# Patient Record
Sex: FEMALE | Race: WHITE | NOT HISPANIC OR LATINO | Employment: OTHER | URBAN - METROPOLITAN AREA
[De-identification: names, ages, dates, MRNs, and addresses within clinical notes are randomized per-mention and may not be internally consistent; named-entity substitution may affect disease eponyms.]

---

## 2020-06-22 ENCOUNTER — HOSPITAL ENCOUNTER (EMERGENCY)
Facility: HOSPITAL | Age: 65
Discharge: HOME | End: 2020-06-22
Attending: EMERGENCY MEDICINE | Admitting: EMERGENCY MEDICINE

## 2020-06-22 VITALS
HEIGHT: 64 IN | DIASTOLIC BLOOD PRESSURE: 73 MMHG | BODY MASS INDEX: 35.85 KG/M2 | SYSTOLIC BLOOD PRESSURE: 176 MMHG | RESPIRATION RATE: 16 BRPM | TEMPERATURE: 98 F | WEIGHT: 210 LBS | HEART RATE: 95 BPM | OXYGEN SATURATION: 95 %

## 2020-06-22 DIAGNOSIS — I10 HYPERTENSION, UNSPECIFIED TYPE: ICD-10-CM

## 2020-06-22 DIAGNOSIS — R25.1 TREMOR: Primary | ICD-10-CM

## 2020-06-22 DIAGNOSIS — U07.1 REAL TIME REVERSE TRANSCRIPTASE PCR POSITIVE FOR COVID-19 VIRUS: ICD-10-CM

## 2020-06-22 LAB
ALBUMIN SERPL-MCNC: 3.8 G/DL (ref 3.5–5.2)
ALBUMIN/GLOB SERPL: 1.3 G/DL
ALP SERPL-CCNC: 104 U/L (ref 39–117)
ALT SERPL W P-5'-P-CCNC: 85 U/L (ref 1–33)
ANION GAP SERPL CALCULATED.3IONS-SCNC: 10 MMOL/L (ref 5–15)
AST SERPL-CCNC: 35 U/L (ref 1–32)
BASOPHILS # BLD AUTO: 0.1 10*3/MM3 (ref 0–0.2)
BASOPHILS NFR BLD AUTO: 0.8 % (ref 0–1.5)
BILIRUB SERPL-MCNC: 0.3 MG/DL (ref 0.2–1.2)
BUN BLD-MCNC: 13 MG/DL (ref 8–23)
BUN/CREAT SERPL: 17.8 (ref 7–25)
CALCIUM SPEC-SCNC: 9.2 MG/DL (ref 8.6–10.5)
CHLORIDE SERPL-SCNC: 108 MMOL/L (ref 98–107)
CO2 SERPL-SCNC: 23 MMOL/L (ref 22–29)
CREAT BLD-MCNC: 0.73 MG/DL (ref 0.57–1)
DEPRECATED RDW RBC AUTO: 55.7 FL (ref 37–54)
EOSINOPHIL # BLD AUTO: 0.29 10*3/MM3 (ref 0–0.4)
EOSINOPHIL NFR BLD AUTO: 2.2 % (ref 0.3–6.2)
ERYTHROCYTE [DISTWIDTH] IN BLOOD BY AUTOMATED COUNT: 15 % (ref 12.3–15.4)
GFR SERPL CREATININE-BSD FRML MDRD: 96 ML/MIN/1.73
GLOBULIN UR ELPH-MCNC: 3 GM/DL
GLUCOSE BLD-MCNC: 113 MG/DL (ref 65–99)
HCT VFR BLD AUTO: 43.6 % (ref 34–46.6)
HGB BLD-MCNC: 14.5 G/DL (ref 12–15.9)
IMM GRANULOCYTES # BLD AUTO: 0.1 10*3/MM3 (ref 0–0.05)
IMM GRANULOCYTES NFR BLD AUTO: 0.8 % (ref 0–0.5)
LARGE PLATELETS: NORMAL
LYMPHOCYTES # BLD AUTO: 4.31 10*3/MM3 (ref 0.7–3.1)
LYMPHOCYTES NFR BLD AUTO: 33 % (ref 19.6–45.3)
MCH RBC QN AUTO: 33.1 PG (ref 26.6–33)
MCHC RBC AUTO-ENTMCNC: 33.3 G/DL (ref 31.5–35.7)
MCV RBC AUTO: 99.5 FL (ref 79–97)
MONOCYTES # BLD AUTO: 1.25 10*3/MM3 (ref 0.1–0.9)
MONOCYTES NFR BLD AUTO: 9.6 % (ref 5–12)
NEUTROPHILS # BLD AUTO: 7 10*3/MM3 (ref 1.7–7)
NEUTROPHILS NFR BLD AUTO: 53.6 % (ref 42.7–76)
NEUTS VAC BLD QL SMEAR: NORMAL
NRBC BLD AUTO-RTO: 0 /100 WBC (ref 0–0.2)
PLATELET # BLD AUTO: 341 10*3/MM3 (ref 140–450)
PMV BLD AUTO: 10.9 FL (ref 6–12)
POTASSIUM BLD-SCNC: 4 MMOL/L (ref 3.5–5.2)
PROT SERPL-MCNC: 6.8 G/DL (ref 6–8.5)
RBC # BLD AUTO: 4.38 10*6/MM3 (ref 3.77–5.28)
RBC MORPH BLD: NORMAL
SODIUM BLD-SCNC: 141 MMOL/L (ref 136–145)
TSH SERPL DL<=0.05 MIU/L-ACNC: 2.48 UIU/ML (ref 0.27–4.2)
WBC NRBC COR # BLD: 13.05 10*3/MM3 (ref 3.4–10.8)

## 2020-06-22 PROCEDURE — 80053 COMPREHEN METABOLIC PANEL: CPT | Performed by: PHYSICIAN ASSISTANT

## 2020-06-22 PROCEDURE — 99284 EMERGENCY DEPT VISIT MOD MDM: CPT

## 2020-06-22 PROCEDURE — 84443 ASSAY THYROID STIM HORMONE: CPT | Performed by: PHYSICIAN ASSISTANT

## 2020-06-22 PROCEDURE — 85025 COMPLETE CBC W/AUTO DIFF WBC: CPT | Performed by: PHYSICIAN ASSISTANT

## 2020-06-22 PROCEDURE — 85007 BL SMEAR W/DIFF WBC COUNT: CPT | Performed by: PHYSICIAN ASSISTANT

## 2020-06-22 RX ORDER — METRONIDAZOLE 500 MG/1
500 TABLET ORAL 3 TIMES DAILY
COMMUNITY
End: 2020-09-17

## 2020-06-22 RX ORDER — LISINOPRIL 10 MG/1
10 TABLET ORAL DAILY
COMMUNITY
End: 2020-09-17

## 2020-06-22 RX ORDER — CEFUROXIME AXETIL 500 MG/1
500 TABLET ORAL 2 TIMES DAILY
COMMUNITY
End: 2020-09-17

## 2020-06-22 RX ORDER — LACTOBACILLUS ACIDOPHILUS / LACTOBACILLUS BULGARICUS 100 MILLION CFU STRENGTH
GRANULES ORAL 3 TIMES DAILY
COMMUNITY
End: 2020-09-17

## 2020-06-22 RX ORDER — AMLODIPINE BESYLATE AND BENAZEPRIL HYDROCHLORIDE 10; 20 MG/1; MG/1
1 CAPSULE ORAL DAILY
COMMUNITY
End: 2020-09-17

## 2020-06-22 RX ORDER — HYDRALAZINE HYDROCHLORIDE 50 MG/1
50 TABLET, FILM COATED ORAL 3 TIMES DAILY
COMMUNITY
End: 2020-09-17

## 2020-06-22 NOTE — ED PROVIDER NOTES
EMERGENCY DEPARTMENT ENCOUNTER    Room Number:  19/19  Date of encounter:  6/23/2020  PCP: Provider, No Known  Historian: Patient      HPI:  Chief Complaint: Shaking all over    A complete HPI/ROS/PMH/PSH/SH/FH are unobtainable due to: N/A    Context: Cristina Medrano is a 65 y.o. female who presents to the ED c/o shaking all over that began last night.  States she recently got discharged from [facility] been diagnosed with COVID.  Apparently had positive blood cultures they covered her with cefuroxime and Flagyl.  She been discharged home a couple days ago.  That she began shaking all over not 1 extremity or the other.  States she has had the most in the upper extremities.  Denies fevers or chills associated with this.  She denies any shortness of breath or cough associated with this.  She said no headache no visual disturbances.      PAST MEDICAL HISTORY  Active Ambulatory Problems     Diagnosis Date Noted   • No Active Ambulatory Problems     Resolved Ambulatory Problems     Diagnosis Date Noted   • No Resolved Ambulatory Problems     Past Medical History:   Diagnosis Date   • Arthritis    • COPD (chronic obstructive pulmonary disease) (CMS/[facility])    • Hyperlipidemia    • Hypertension          PAST SURGICAL HISTORY  History reviewed. No pertinent surgical history.      FAMILY HISTORY  History reviewed. No pertinent family history.      SOCIAL HISTORY  Social History     Socioeconomic History   • Marital status:      Spouse name: Not on file   • Number of children: Not on file   • Years of education: Not on file   • Highest education level: Not on file   Tobacco Use   • Smoking status: Current Every Day Smoker     Packs/day: 1.00   Substance and Sexual Activity   • Alcohol use: Never     Frequency: Never   • Drug use: Never   • Sexual activity: Defer         ALLERGIES  Patient has no known allergies.        REVIEW OF SYSTEMS  Review of Systems     All systems reviewed and negative except for those  discussed in HPI.       PHYSICAL EXAM    I have reviewed the triage vital signs and nursing notes.    ED Triage Vitals [06/22/20 1224]   Temp Heart Rate Resp BP SpO2   98 °F (36.7 °C) 116 18 173/71 95 %      Temp src Heart Rate Source Patient Position BP Location FiO2 (%)   Infrared Monitor Sitting Left arm --       Physical Exam  GENERAL: Warm pink dry afebrile well developed.  Appears in no acute distress.  HENT: Nares patent  EYES: No scleral icterus  CV: Regular rhythm, regular rate  RESPIRATORY: Normal effort.  No audible wheezes, rales or rhonchi  ABDOMEN: Soft, nontender  MUSCULOSKELETAL: No deformities.   NEURO: Alert, moves all extremities, follows commands she has some upper extremity tremors of both upper extremities but they seem to resolve with distraction.  Lower extremities are not tremulous.  Normal finger-to-nose no palmar drift.  Strength 5/5.  SKIN: Warm, dry, no rash visualized        LAB RESULTS  Recent Results (from the past 24 hour(s))   Comprehensive Metabolic Panel    Collection Time: 06/22/20  4:43 PM   Result Value Ref Range    Glucose 113 (H) 65 - 99 mg/dL    BUN 13 8 - 23 mg/dL    Creatinine 0.73 0.57 - 1.00 mg/dL    Sodium 141 136 - 145 mmol/L    Potassium 4.0 3.5 - 5.2 mmol/L    Chloride 108 (H) 98 - 107 mmol/L    CO2 23.0 22.0 - 29.0 mmol/L    Calcium 9.2 8.6 - 10.5 mg/dL    Total Protein 6.8 6.0 - 8.5 g/dL    Albumin 3.80 3.50 - 5.20 g/dL    ALT (SGPT) 85 (H) 1 - 33 U/L    AST (SGOT) 35 (H) 1 - 32 U/L    Alkaline Phosphatase 104 39 - 117 U/L    Total Bilirubin 0.3 0.2 - 1.2 mg/dL    eGFR  African Amer 96 >60 mL/min/1.73    Globulin 3.0 gm/dL    A/G Ratio 1.3 g/dL    BUN/Creatinine Ratio 17.8 7.0 - 25.0    Anion Gap 10.0 5.0 - 15.0 mmol/L   CBC Auto Differential    Collection Time: 06/22/20  4:43 PM   Result Value Ref Range    WBC 13.05 (H) 3.40 - 10.80 10*3/mm3    RBC 4.38 3.77 - 5.28 10*6/mm3    Hemoglobin 14.5 12.0 - 15.9 g/dL    Hematocrit 43.6 34.0 - 46.6 %    MCV 99.5 (H) 79.0 -  97.0 fL    MCH 33.1 (H) 26.6 - 33.0 pg    MCHC 33.3 31.5 - 35.7 g/dL    RDW 15.0 12.3 - 15.4 %    RDW-SD 55.7 (H) 37.0 - 54.0 fl    MPV 10.9 6.0 - 12.0 fL    Platelets 341 140 - 450 10*3/mm3    Neutrophil % 53.6 42.7 - 76.0 %    Lymphocyte % 33.0 19.6 - 45.3 %    Monocyte % 9.6 5.0 - 12.0 %    Eosinophil % 2.2 0.3 - 6.2 %    Basophil % 0.8 0.0 - 1.5 %    Immature Grans % 0.8 (H) 0.0 - 0.5 %    Neutrophils, Absolute 7.00 1.70 - 7.00 10*3/mm3    Lymphocytes, Absolute 4.31 (H) 0.70 - 3.10 10*3/mm3    Monocytes, Absolute 1.25 (H) 0.10 - 0.90 10*3/mm3    Eosinophils, Absolute 0.29 0.00 - 0.40 10*3/mm3    Basophils, Absolute 0.10 0.00 - 0.20 10*3/mm3    Immature Grans, Absolute 0.10 (H) 0.00 - 0.05 10*3/mm3    nRBC 0.0 0.0 - 0.2 /100 WBC   TSH    Collection Time: 06/22/20  4:43 PM   Result Value Ref Range    TSH 2.480 0.270 - 4.200 uIU/mL   Scan Slide    Collection Time: 06/22/20  4:43 PM   Result Value Ref Range    RBC Morphology Normal Normal    Vacuolated Neutrophils Slight/1+ None Seen    Large Platelets Slight/1+ None Seen       Ordered the above labs and independently reviewed the results.        RADIOLOGY  No Radiology Exams Resulted Within Past 24 Hours    I ordered and reviewed the above noted radiographic studies.          PROCEDURES    Procedures      MEDICATIONS GIVEN IN ER    Medications - No data to display      PROGRESS, DATA ANALYSIS, CONSULTS, AND MEDICAL DECISION MAKING    All labs have been independently reviewed by me.  All radiology studies have been reviewed by me and the radiologist dictating the report.   EKG's have been independently viewed and interpreted by me.      Differential diagnoses:            Discussed the findings with the patient.  We will have her follow-up with her primary care physician.  Her O2 sats look appropriate and see think it looks acute.      AS OF 07:19 VITALS:    BP - 176/73  HR - 95  TEMP - 98 °F (36.7 °C) (Infrared)  O2 SATS - 95%        DIAGNOSIS  Final diagnoses:    Tremor   Real time reverse transcriptase PCR positive for COVID-19 virus   Hypertension, unspecified type         DISPOSITION  DISCHARGE    Patient discharged in stable condition.    Reviewed implications of results, diagnosis, meds, responsibility to follow up, warning signs and symptoms of possible worsening, potential complications and reasons to return to ER.    Patient/Family voiced understanding of above instructions.    Discussed plan for discharge, as there is no emergent indication for admission.  Pt/family is agreeable and understands need for follow up and possible repeat testing.  Pt/family is aware that discharge does not mean that nothing is wrong but that it indicates no emergency is currently present that requires admission and they must continue care with follow-up as given below or with a physician of their choice.     FOLLOW-UP  Your primary care provider as set up at [facility].               Medication List      New Prescriptions    metoprolol tartrate 25 MG tablet  Commonly known as:  LOPRESSOR  Take 1 tablet by mouth 2 (Two) Times a Day.                     Luis Raymundo PA  06/23/20 0718

## 2020-09-17 ENCOUNTER — HOSPITAL ENCOUNTER (EMERGENCY)
Facility: HOSPITAL | Age: 65
Discharge: HOME | End: 2020-09-17
Attending: EMERGENCY MEDICINE | Admitting: EMERGENCY MEDICINE

## 2020-09-17 ENCOUNTER — APPOINTMENT (OUTPATIENT)
Dept: MRI IMAGING | Facility: HOSPITAL | Age: 65
End: 2020-09-17

## 2020-09-17 VITALS
BODY MASS INDEX: 29.88 KG/M2 | HEIGHT: 64 IN | HEART RATE: 75 BPM | DIASTOLIC BLOOD PRESSURE: 74 MMHG | SYSTOLIC BLOOD PRESSURE: 172 MMHG | OXYGEN SATURATION: 96 % | WEIGHT: 175 LBS | TEMPERATURE: 98.7 F | RESPIRATION RATE: 18 BRPM

## 2020-09-17 DIAGNOSIS — J32.0 LEFT MAXILLARY SINUSITIS: Primary | ICD-10-CM

## 2020-09-17 DIAGNOSIS — R13.10 DYSPHAGIA, UNSPECIFIED TYPE: ICD-10-CM

## 2020-09-17 DIAGNOSIS — I10 ELEVATED BLOOD PRESSURE READING WITH DIAGNOSIS OF HYPERTENSION: ICD-10-CM

## 2020-09-17 LAB
ALBUMIN SERPL-MCNC: 3.9 G/DL (ref 3.5–5.2)
ALBUMIN/GLOB SERPL: 1.3 G/DL
ALP SERPL-CCNC: 100 U/L (ref 39–117)
ALT SERPL W P-5'-P-CCNC: 29 U/L (ref 1–33)
ANION GAP SERPL CALCULATED.3IONS-SCNC: 10 MMOL/L (ref 5–15)
AST SERPL-CCNC: 22 U/L (ref 1–32)
BASOPHILS # BLD AUTO: 0.07 10*3/MM3 (ref 0–0.2)
BASOPHILS NFR BLD AUTO: 0.6 % (ref 0–1.5)
BILIRUB SERPL-MCNC: 0.4 MG/DL (ref 0–1.2)
BILIRUB UR QL STRIP: NEGATIVE
BUN SERPL-MCNC: 4 MG/DL (ref 8–23)
BUN/CREAT SERPL: 6.5 (ref 7–25)
CALCIUM SPEC-SCNC: 9.6 MG/DL (ref 8.6–10.5)
CHLORIDE SERPL-SCNC: 104 MMOL/L (ref 98–107)
CLARITY UR: CLEAR
CO2 SERPL-SCNC: 28 MMOL/L (ref 22–29)
COLOR UR: YELLOW
CREAT SERPL-MCNC: 0.62 MG/DL (ref 0.57–1)
DEPRECATED RDW RBC AUTO: 57.9 FL (ref 37–54)
EOSINOPHIL # BLD AUTO: 0.22 10*3/MM3 (ref 0–0.4)
EOSINOPHIL NFR BLD AUTO: 1.8 % (ref 0.3–6.2)
ERYTHROCYTE [DISTWIDTH] IN BLOOD BY AUTOMATED COUNT: 15 % (ref 12.3–15.4)
GFR SERPL CREATININE-BSD FRML MDRD: 116 ML/MIN/1.73
GLOBULIN UR ELPH-MCNC: 3 GM/DL
GLUCOSE SERPL-MCNC: 102 MG/DL (ref 65–99)
GLUCOSE UR STRIP-MCNC: NEGATIVE MG/DL
HCT VFR BLD AUTO: 47.2 % (ref 34–46.6)
HGB BLD-MCNC: 15.5 G/DL (ref 12–15.9)
HGB UR QL STRIP.AUTO: NEGATIVE
IMM GRANULOCYTES # BLD AUTO: 0.05 10*3/MM3 (ref 0–0.05)
IMM GRANULOCYTES NFR BLD AUTO: 0.4 % (ref 0–0.5)
KETONES UR QL STRIP: NEGATIVE
LEUKOCYTE ESTERASE UR QL STRIP.AUTO: NEGATIVE
LYMPHOCYTES # BLD AUTO: 3.62 10*3/MM3 (ref 0.7–3.1)
LYMPHOCYTES NFR BLD AUTO: 30.2 % (ref 19.6–45.3)
MCH RBC QN AUTO: 33.9 PG (ref 26.6–33)
MCHC RBC AUTO-ENTMCNC: 32.8 G/DL (ref 31.5–35.7)
MCV RBC AUTO: 103.3 FL (ref 79–97)
MONOCYTES # BLD AUTO: 0.9 10*3/MM3 (ref 0.1–0.9)
MONOCYTES NFR BLD AUTO: 7.5 % (ref 5–12)
NEUTROPHILS NFR BLD AUTO: 59.5 % (ref 42.7–76)
NEUTROPHILS NFR BLD AUTO: 7.13 10*3/MM3 (ref 1.7–7)
NITRITE UR QL STRIP: NEGATIVE
NRBC BLD AUTO-RTO: 0 /100 WBC (ref 0–0.2)
PH UR STRIP.AUTO: 7.5 [PH] (ref 5–8)
PLATELET # BLD AUTO: 278 10*3/MM3 (ref 140–450)
PMV BLD AUTO: 11.7 FL (ref 6–12)
POTASSIUM SERPL-SCNC: 3.6 MMOL/L (ref 3.5–5.2)
PROT SERPL-MCNC: 6.9 G/DL (ref 6–8.5)
PROT UR QL STRIP: NEGATIVE
RBC # BLD AUTO: 4.57 10*6/MM3 (ref 3.77–5.28)
SODIUM SERPL-SCNC: 142 MMOL/L (ref 136–145)
SP GR UR STRIP: <=1.005 (ref 1–1.03)
TROPONIN T SERPL-MCNC: <0.01 NG/ML (ref 0–0.03)
UROBILINOGEN UR QL STRIP: NORMAL
WBC # BLD AUTO: 11.99 10*3/MM3 (ref 3.4–10.8)

## 2020-09-17 PROCEDURE — 96374 THER/PROPH/DIAG INJ IV PUSH: CPT

## 2020-09-17 PROCEDURE — 70551 MRI BRAIN STEM W/O DYE: CPT

## 2020-09-17 PROCEDURE — 85025 COMPLETE CBC W/AUTO DIFF WBC: CPT | Performed by: EMERGENCY MEDICINE

## 2020-09-17 PROCEDURE — 81003 URINALYSIS AUTO W/O SCOPE: CPT | Performed by: EMERGENCY MEDICINE

## 2020-09-17 PROCEDURE — 93005 ELECTROCARDIOGRAM TRACING: CPT | Performed by: EMERGENCY MEDICINE

## 2020-09-17 PROCEDURE — 80053 COMPREHEN METABOLIC PANEL: CPT | Performed by: EMERGENCY MEDICINE

## 2020-09-17 PROCEDURE — 84484 ASSAY OF TROPONIN QUANT: CPT | Performed by: EMERGENCY MEDICINE

## 2020-09-17 PROCEDURE — 99284 EMERGENCY DEPT VISIT MOD MDM: CPT

## 2020-09-17 RX ORDER — LABETALOL HYDROCHLORIDE 5 MG/ML
20 INJECTION, SOLUTION INTRAVENOUS ONCE
Status: COMPLETED | OUTPATIENT
Start: 2020-09-17 | End: 2020-09-17

## 2020-09-17 RX ORDER — LOSARTAN POTASSIUM 50 MG/1
50 TABLET ORAL DAILY
Qty: 30 TABLET | Refills: 0 | Status: SHIPPED | OUTPATIENT
Start: 2020-09-17 | End: 2020-10-19

## 2020-09-17 RX ORDER — LOSARTAN POTASSIUM 25 MG/1
25 TABLET ORAL DAILY
COMMUNITY
End: 2020-09-17 | Stop reason: SDUPTHER

## 2020-09-17 RX ORDER — LOSARTAN POTASSIUM 50 MG/1
50 TABLET ORAL
Status: DISCONTINUED | OUTPATIENT
Start: 2020-09-17 | End: 2020-09-17 | Stop reason: HOSPADM

## 2020-09-17 RX ORDER — AMOXICILLIN 400 MG/5ML
800 POWDER, FOR SUSPENSION ORAL 2 TIMES DAILY
Qty: 200 ML | Refills: 0 | Status: SHIPPED | OUTPATIENT
Start: 2020-09-17 | End: 2020-10-19

## 2020-09-17 RX ORDER — METOPROLOL TARTRATE 50 MG/1
50 TABLET, FILM COATED ORAL 2 TIMES DAILY
Qty: 30 TABLET | Refills: 0 | Status: SHIPPED | OUTPATIENT
Start: 2020-09-17 | End: 2020-10-19

## 2020-09-17 RX ADMIN — LOSARTAN POTASSIUM 50 MG: 50 TABLET, FILM COATED ORAL at 15:53

## 2020-09-17 RX ADMIN — LABETALOL 20 MG/4 ML (5 MG/ML) INTRAVENOUS SYRINGE 20 MG: at 18:07

## 2020-09-17 NOTE — ED PROVIDER NOTES
Subjective   Mrs. Medrano is sent from Dr. Colindres for concerns about her blood pressure.  They reported EMS that when they saw her today her blood pressure was over 200 systolic.  Mrs. Medrano tells me she has had a headache for a couple of days.  Her main concern is that of dry mouth.  This has been an ongoing issue with her.  She tells me she has been to the emergency department at [facility] at least 3 times for that.  Her primary care provider reported to EMS today that she has had aphasia for a month or 2.  Mrs. Medrano tells me that she does sometimes have trouble getting her words out.  She has numerous drug intolerances and tells me her doctors have been having a hard time getting her blood pressure under control.  She denies fevers or chills.  She denies focal numbness or weakness in any extremity but tells me she does feel off balance and dizzy when she walks.      History provided by:  Patient and EMS personnel  Hypertension  Severity:  Severe  Onset quality:  Gradual  Timing:  Intermittent  Progression:  Worsening  Chronicity:  Recurrent  Relieved by:  Nothing  Worsened by:  Nothing  Associated symptoms: dizziness, fatigue and headaches    Associated symptoms: no abdominal pain, no blurred vision, no chest pain, no confusion, no nausea, no neck pain, no shortness of breath and not vomiting    Headache  Associated symptoms: congestion, dizziness and fatigue    Associated symptoms: no abdominal pain, no back pain, no blurred vision, no cough, no nausea, no neck pain and no vomiting        Review of Systems   Constitutional: Positive for fatigue. Negative for chills.   HENT: Positive for congestion and rhinorrhea.    Eyes: Negative for blurred vision and visual disturbance.   Respiratory: Negative for cough and shortness of breath.    Cardiovascular: Negative for chest pain and leg swelling.   Gastrointestinal: Negative for abdominal pain, nausea and vomiting.   Genitourinary: Negative for difficulty  urinating and dysuria.   Musculoskeletal: Negative for back pain and neck pain.   Neurological: Positive for dizziness and headaches.   Psychiatric/Behavioral: Negative for confusion.       Past Medical History:   Diagnosis Date   • Arthritis    • COPD (chronic obstructive pulmonary disease) (CMS/[facility])    • Hyperlipidemia    • Hypertension        Allergies   Allergen Reactions   • Clonidine Derivatives Angioedema   • Hydralazine Unknown - Low Severity   • Lisinopril Cough   • Prednisone Other (See Comments)     SHAKING AND BURNING       History reviewed. No pertinent surgical history.    History reviewed. No pertinent family history.    Social History     Socioeconomic History   • Marital status:      Spouse name: Not on file   • Number of children: Not on file   • Years of education: Not on file   • Highest education level: Not on file   Tobacco Use   • Smoking status: Current Every Day Smoker     Packs/day: 1.00   Substance and Sexual Activity   • Alcohol use: Never     Frequency: Never   • Drug use: Never   • Sexual activity: Defer           Objective   Physical Exam  Vitals signs and nursing note reviewed.   HENT:      Head: Normocephalic and atraumatic.      Mouth/Throat:      Mouth: Mucous membranes are moist.      Pharynx: Oropharynx is clear. No oropharyngeal exudate or posterior oropharyngeal erythema.      Comments: She is handling her secretions well  Eyes:      General: No scleral icterus.     Conjunctiva/sclera: Conjunctivae normal.   Neck:      Musculoskeletal: Normal range of motion and neck supple.      Comments: No JVD  Cardiovascular:      Rate and Rhythm: Normal rate and regular rhythm.      Heart sounds: No murmur. No friction rub.   Pulmonary:      Effort: Pulmonary effort is normal.      Breath sounds: Normal breath sounds. No wheezing, rhonchi or rales.   Abdominal:      General: Abdomen is flat. Bowel sounds are normal. There is no distension.      Palpations: Abdomen is soft.       Tenderness: There is no abdominal tenderness.   Musculoskeletal:         General: No swelling or tenderness.      Right lower leg: No edema.      Left lower leg: No edema.   Skin:     General: Skin is warm and dry.   Neurological:      General: No focal deficit present.      Mental Status: She is alert and oriented to person, place, and time.      Comments: She is fully oriented.   are equal.  Finger-to-nose testing is intact and symmetric   Psychiatric:         Mood and Affect: Mood normal.         Behavior: Behavior normal.         Thought Content: Thought content normal.         Procedures           ED Course  ED Course as of Sep 17 1936   Thu Sep 17, 2020   1832 Mrs. Medrano's sister pulled me aside while she was at MRI.  She advises me that since Mrs. Medrano was admitted to the hospital 4 months ago she has had a change in personality.  She reports that she has been anxious, her  and family are afraid to leave her alone.  She has been to numerous emergency departments and is having issues with her blood pressure as well as anxiety.  Upon repeat exam her blood pressure is 149/62 and she is calm.  She remains alert and in no distress.  She was fully oriented on on my exam.  I spoke with him about negative MRI findings and possibility of sinusitis.  She tells me she tolerates liquid amoxicillin which I will provide.  Her sister tells me she has an appointment with Dr. SHIVAM Tucker soon to evaluate her difficulty swallowing.  I will also refer her to follow-up with neurology and gastroenterology.  I brought up the possibility of ridge evaluation and she declines.    [DT]   1837 I spoke with her about her blood pressure medicines and see shows me her bottles and the only thing she is taking now is 25 mg of Cozaar daily and 50 mg metoprolol twice daily.  I will increase her Cozaar to 50 a day.    [DT]      ED Course User Index  [DT] Arnie Olsen MD                                           [facility]  Number of  Diagnoses or Management Options  Dysphagia, unspecified type: established and worsening  Elevated blood pressure reading with diagnosis of hypertension: established and worsening  Left maxillary sinusitis: new and requires workup     Amount and/or Complexity of Data Reviewed  Clinical lab tests: reviewed and ordered  Tests in the radiology section of CPT®: ordered and reviewed  Decide to obtain previous medical records or to obtain history from someone other than the patient: yes  Obtain history from someone other than the patient: yes  Review and summarize past medical records: yes  Independent visualization of images, tracings, or specimens: yes    Patient Progress  Patient progress: improved      Final diagnoses:   Left maxillary sinusitis   Elevated blood pressure reading with diagnosis of hypertension   Dysphagia, unspecified type            Arnie Olsen MD  09/17/20 1936

## 2020-09-17 NOTE — DISCHARGE INSTRUCTIONS
Begin the higher dose of Cozaar in the morning.  Call the [facility] neurology office in the morning and arrange follow-up with them.  Call the [facility] gastroenterology office in the morning as well and arrange follow-up with them for further evaluation of your difficulty swallowing.Please call ASAP to arrange outpatient follow up with one of the following gastroenterologists:    [facility] GI Provider Dr. Noe Gould, Dr. SHIRAZ Roth, Dr. Bill Lee, ANDREY Hutchison    Office Location:  [address] [place] [address]    Office # [phone]    Or    [facility] GI Provider Dr. Juan Kaur and Dr. Luis Manuel Trimble    Office Location:   [address] [place] [address]    Office # [phone]

## 2020-10-19 ENCOUNTER — OFFICE VISIT (OUTPATIENT)
Dept: GASTROENTEROLOGY | Facility: CLINIC | Age: 65
End: 2020-10-19

## 2020-10-19 VITALS
HEART RATE: 89 BPM | SYSTOLIC BLOOD PRESSURE: 137 MMHG | DIASTOLIC BLOOD PRESSURE: 74 MMHG | HEIGHT: 64 IN | BODY MASS INDEX: 29.5 KG/M2 | WEIGHT: 172.8 LBS | TEMPERATURE: 97.5 F

## 2020-10-19 DIAGNOSIS — R14.0 BLOATING: Primary | ICD-10-CM

## 2020-10-19 PROCEDURE — 99204 OFFICE O/P NEW MOD 45 MIN: CPT | Performed by: NURSE PRACTITIONER

## 2020-10-19 RX ORDER — ATENOLOL 50 MG/1
50 TABLET ORAL DAILY
COMMUNITY
Start: 2020-10-11

## 2020-10-19 RX ORDER — CITALOPRAM 20 MG/1
20 TABLET ORAL DAILY
COMMUNITY
Start: 2020-09-02 | End: 2021-03-17

## 2020-10-19 RX ORDER — SERTRALINE HYDROCHLORIDE 25 MG/1
1 TABLET, FILM COATED ORAL DAILY
COMMUNITY
Start: 2020-09-18 | End: 2021-03-17

## 2020-10-19 RX ORDER — CLONIDINE HYDROCHLORIDE 0.1 MG/1
1 TABLET ORAL DAILY
COMMUNITY
Start: 2020-07-28 | End: 2021-03-17

## 2020-10-19 NOTE — PROGRESS NOTES
GASTROENTEROLOGY OFFICE NOTE  Cristina Medrano  [id number]  1955    CARE TEAM  Patient Care Team:  Umu Colindres MD as PCP - General (Family Medicine)    Referring Provider: mUu Colindres MD    Chief Complaint   Patient presents with   • Bloated        HISTORY OF PRESENT ILLNESS:  Ms. Medrano presents with a 4-month history of abdominal bloating and belching.  She reports that when her symptoms first began she felt bloated after eating frequently but was not expelling gas.  Over the past few weeks she has been having a significant amount of belching.  She has noted some foods that make her bloating worse.  She denies acid reflux, she reports she has had heartburn maybe twice in her life.  She denies associated nausea, vomiting, dysphagia and odynophagia.  She has a soft formed bowel movement daily, sometimes 2 daily.  She reports a history of colon polyps, she recalls 3 colon polyps and her last colonoscopy that was done many years ago.    PAST MEDICAL HISTORY  Past Medical History:   Diagnosis Date   • Arthritis    • COPD (chronic obstructive pulmonary disease) (CMS/[facility])    • Hyperlipidemia    • Hypertension         PAST SURGICAL HISTORY  History reviewed. No pertinent surgical history.     MEDICATIONS:    Current Outpatient Medications:   •  sertraline (Zoloft) 25 MG tablet, Take 1 tablet by mouth Daily., Disp: , Rfl:   •  atenolol (TENORMIN) 50 MG tablet, Take 50 mg by mouth Daily., Disp: , Rfl:   •  citalopram (CeleXA) 20 MG tablet, Take 20 mg by mouth Daily. FOR 30 DAYS, Disp: , Rfl:   •  cloNIDine (CATAPRES) 0.1 MG tablet, Take 1 tablet by mouth Daily., Disp: , Rfl:     ALLERGIES  Allergies   Allergen Reactions   • Amlodipine Other (See Comments)   • Antihistamines, Chlorpheniramine-Type Other (See Comments)   • Clonidine Derivatives Angioedema   • Hydralazine Unknown - Low Severity   • Lisinopril Cough   • Metoprolol Other (See Comments)   • Prednisone Other (See Comments)     SHAKING AND  BURNING       FAMILY HISTORY:  Family History   Problem Relation Age of Onset   • Colon polyps Mother    • Colon cancer Neg Hx        SOCIAL HISTORY  Social History     Socioeconomic History   • Marital status:      Spouse name: Not on file   • Number of children: Not on file   • Years of education: Not on file   • Highest education level: Not on file   Tobacco Use   • Smoking status: Current Every Day Smoker     Packs/day: 1.00   • Smokeless tobacco: Never Used   Substance and Sexual Activity   • Alcohol use: Never     Frequency: Never   • Drug use: Never   • Sexual activity: Defer       REVIEW OF SYSTEMS  Review of Systems   Constitutional: Negative for activity change, appetite change, chills, diaphoresis, fatigue, fever, unexpected weight gain and unexpected weight loss.   HENT: Negative for congestion, dental problem, drooling, ear discharge, ear pain, facial swelling, hearing loss, mouth sores, nosebleeds, postnasal drip, rhinorrhea, sinus pressure, sneezing, sore throat, swollen glands, tinnitus, trouble swallowing and voice change.    Respiratory: Negative for apnea, cough, choking, chest tightness, shortness of breath, wheezing and stridor.    Cardiovascular: Negative for chest pain, palpitations and leg swelling.   Gastrointestinal: Positive for abdominal distention and indigestion. Negative for abdominal pain, anal bleeding, blood in stool, constipation, diarrhea, nausea, rectal pain, vomiting and GERD.   Endocrine: Negative for cold intolerance, heat intolerance, polydipsia, polyphagia and polyuria.   Musculoskeletal: Negative for arthralgias, back pain, gait problem, joint swelling, myalgias, neck pain, neck stiffness and bursitis.   Skin: Negative for color change, dry skin, rash and skin lesions.   Allergic/Immunologic: Negative for environmental allergies, food allergies and immunocompromised state.   Neurological: Negative for dizziness, tremors, seizures, syncope, facial asymmetry, speech  "difficulty, weakness, light-headedness, numbness, headache, memory problem and confusion.   Hematological: Negative for adenopathy. Does not bruise/bleed easily.   Psychiatric/Behavioral: Negative for agitation, behavioral problems, decreased concentration, dysphoric mood, hallucinations, self-injury, sleep disturbance, suicidal ideas, negative for hyperactivity, depressed mood and stress. The patient is nervous/anxious.          PHYSICAL EXAM   /74 (BP Location: Right arm, Patient Position: Sitting, Cuff Size: Adult)   Pulse 89   Temp 97.5 °F (36.4 °C) (Temporal)   Ht 162.6 cm (64.02\")   Wt 78.4 kg (172 lb 12.8 oz)   BMI 29.65 kg/m²   Physical Exam  Vitals signs and nursing note reviewed.   Constitutional:       Appearance: Normal appearance. She is well-developed.   HENT:      Head: Normocephalic and atraumatic.      Nose: Nose normal.      Mouth/Throat:      Mouth: Mucous membranes are moist.      Pharynx: Oropharynx is clear.   Eyes:      Pupils: Pupils are equal, round, and reactive to light.   Neck:      Musculoskeletal: Normal range of motion and neck supple.   Cardiovascular:      Rate and Rhythm: Normal rate and regular rhythm.   Pulmonary:      Effort: Pulmonary effort is normal.      Breath sounds: Normal breath sounds. No wheezing or rales.   Abdominal:      General: Bowel sounds are normal.      Palpations: Abdomen is soft. There is no mass.      Tenderness: There is abdominal tenderness in the epigastric area. There is no guarding or rebound.      Hernia: No hernia is present.   Musculoskeletal: Normal range of motion.   Skin:     General: Skin is warm and dry.   Neurological:      Mental Status: She is alert and oriented to person, place, and time.      Cranial Nerves: No cranial nerve deficit.   Psychiatric:         Behavior: Behavior normal.         Judgment: Judgment normal.           Results Review:  [facility] records reviewed and discussed with patient.     ASSESSMENT / PLAN  1.  " Bloating  -Breath urease testing for H. pylori  -FODMAP diet  2.  Screen for colon cancer/history of colon polyps  -Colonoscopy recommended, patient refused colonoscopy as she states she has severe anxiety.  We had an in-depth conversation about her risk for colon cancer especially given her previous history of colon polyps.  She reports that once she gets her anxiety under control that she will revisit the possibility of colonoscopy.    Return in about 2 months (around 12/19/2020).    I discussed the patients findings and my recommendations with patient    ANDREY Mcknight

## 2020-10-20 ENCOUNTER — LAB (OUTPATIENT)
Dept: LAB | Facility: HOSPITAL | Age: 65
End: 2020-10-20

## 2020-10-20 DIAGNOSIS — R14.0 BLOATING: ICD-10-CM

## 2020-10-20 PROCEDURE — 83013 H PYLORI (C-13) BREATH: CPT | Performed by: NURSE PRACTITIONER

## 2020-10-21 LAB — UREA BREATH TEST QL: NEGATIVE

## 2021-03-09 ENCOUNTER — IMMUNIZATION (OUTPATIENT)
Dept: VACCINE CLINIC | Facility: HOSPITAL | Age: 66
End: 2021-03-09

## 2021-03-09 PROCEDURE — 91300 HC SARSCOV02 VAC 30MCG/0.3ML IM: CPT | Performed by: INTERNAL MEDICINE

## 2021-03-09 PROCEDURE — 0001A: CPT | Performed by: INTERNAL MEDICINE

## 2021-03-17 ENCOUNTER — HOSPITAL ENCOUNTER (EMERGENCY)
Facility: HOSPITAL | Age: 66
Discharge: HOME | End: 2021-03-17
Attending: EMERGENCY MEDICINE | Admitting: EMERGENCY MEDICINE

## 2021-03-17 ENCOUNTER — APPOINTMENT (OUTPATIENT)
Dept: GENERAL RADIOLOGY | Facility: HOSPITAL | Age: 66
End: 2021-03-17

## 2021-03-17 VITALS
DIASTOLIC BLOOD PRESSURE: 66 MMHG | SYSTOLIC BLOOD PRESSURE: 158 MMHG | HEART RATE: 55 BPM | RESPIRATION RATE: 17 BRPM | TEMPERATURE: 98 F | OXYGEN SATURATION: 95 % | BODY MASS INDEX: 33.13 KG/M2 | WEIGHT: 180 LBS | HEIGHT: 62 IN

## 2021-03-17 DIAGNOSIS — I10 UNCONTROLLED HYPERTENSION: ICD-10-CM

## 2021-03-17 DIAGNOSIS — R07.9 INTERMITTENT CHEST PAIN: Primary | ICD-10-CM

## 2021-03-17 LAB
ALBUMIN SERPL-MCNC: 4.3 G/DL (ref 3.5–5.2)
ALBUMIN/GLOB SERPL: 1.4 G/DL
ALP SERPL-CCNC: 103 U/L (ref 39–117)
ALT SERPL W P-5'-P-CCNC: 20 U/L (ref 1–33)
ANION GAP SERPL CALCULATED.3IONS-SCNC: 11 MMOL/L (ref 5–15)
AST SERPL-CCNC: 18 U/L (ref 1–32)
BASOPHILS # BLD AUTO: 0.06 10*3/MM3 (ref 0–0.2)
BASOPHILS NFR BLD AUTO: 0.4 % (ref 0–1.5)
BILIRUB SERPL-MCNC: 0.8 MG/DL (ref 0–1.2)
BUN SERPL-MCNC: 17 MG/DL (ref 8–23)
BUN/CREAT SERPL: 23.9 (ref 7–25)
CALCIUM SPEC-SCNC: 8.8 MG/DL (ref 8.6–10.5)
CHLORIDE SERPL-SCNC: 99 MMOL/L (ref 98–107)
CO2 SERPL-SCNC: 27 MMOL/L (ref 22–29)
CREAT SERPL-MCNC: 0.71 MG/DL (ref 0.57–1)
DEPRECATED RDW RBC AUTO: 52.3 FL (ref 37–54)
EOSINOPHIL # BLD AUTO: 0.35 10*3/MM3 (ref 0–0.4)
EOSINOPHIL NFR BLD AUTO: 2.5 % (ref 0.3–6.2)
ERYTHROCYTE [DISTWIDTH] IN BLOOD BY AUTOMATED COUNT: 14.3 % (ref 12.3–15.4)
GFR SERPL CREATININE-BSD FRML MDRD: 82 ML/MIN/1.73
GLOBULIN UR ELPH-MCNC: 3.1 GM/DL
GLUCOSE SERPL-MCNC: 83 MG/DL (ref 65–99)
HCT VFR BLD AUTO: 48.6 % (ref 34–46.6)
HGB BLD-MCNC: 16.2 G/DL (ref 12–15.9)
HOLD SPECIMEN: NORMAL
IMM GRANULOCYTES # BLD AUTO: 0.04 10*3/MM3 (ref 0–0.05)
IMM GRANULOCYTES NFR BLD AUTO: 0.3 % (ref 0–0.5)
LIPASE SERPL-CCNC: 38 U/L (ref 13–60)
LYMPHOCYTES # BLD AUTO: 5.15 10*3/MM3 (ref 0.7–3.1)
LYMPHOCYTES NFR BLD AUTO: 37.5 % (ref 19.6–45.3)
MCH RBC QN AUTO: 33.2 PG (ref 26.6–33)
MCHC RBC AUTO-ENTMCNC: 33.3 G/DL (ref 31.5–35.7)
MCV RBC AUTO: 99.6 FL (ref 79–97)
MONOCYTES # BLD AUTO: 1.17 10*3/MM3 (ref 0.1–0.9)
MONOCYTES NFR BLD AUTO: 8.5 % (ref 5–12)
NEUTROPHILS NFR BLD AUTO: 50.8 % (ref 42.7–76)
NEUTROPHILS NFR BLD AUTO: 6.96 10*3/MM3 (ref 1.7–7)
NRBC BLD AUTO-RTO: 0 /100 WBC (ref 0–0.2)
NT-PROBNP SERPL-MCNC: 259.1 PG/ML (ref 0–900)
PLAT MORPH BLD: NORMAL
PLATELET # BLD AUTO: 308 10*3/MM3 (ref 140–450)
PMV BLD AUTO: 10.7 FL (ref 6–12)
POTASSIUM SERPL-SCNC: 4.1 MMOL/L (ref 3.5–5.2)
PROT SERPL-MCNC: 7.4 G/DL (ref 6–8.5)
QT INTERVAL: 414 MS
QT INTERVAL: 416 MS
QTC INTERVAL: 388 MS
QTC INTERVAL: 401 MS
RBC # BLD AUTO: 4.88 10*6/MM3 (ref 3.77–5.28)
RBC MORPH BLD: NORMAL
SODIUM SERPL-SCNC: 137 MMOL/L (ref 136–145)
TROPONIN T SERPL-MCNC: <0.01 NG/ML (ref 0–0.03)
TROPONIN T SERPL-MCNC: <0.01 NG/ML (ref 0–0.03)
WBC # BLD AUTO: 13.73 10*3/MM3 (ref 3.4–10.8)
WBC MORPH BLD: NORMAL
WHOLE BLOOD HOLD SPECIMEN: NORMAL
WHOLE BLOOD HOLD SPECIMEN: NORMAL

## 2021-03-17 PROCEDURE — 80053 COMPREHEN METABOLIC PANEL: CPT | Performed by: EMERGENCY MEDICINE

## 2021-03-17 PROCEDURE — 83880 ASSAY OF NATRIURETIC PEPTIDE: CPT | Performed by: EMERGENCY MEDICINE

## 2021-03-17 PROCEDURE — 99285 EMERGENCY DEPT VISIT HI MDM: CPT

## 2021-03-17 PROCEDURE — 83690 ASSAY OF LIPASE: CPT | Performed by: EMERGENCY MEDICINE

## 2021-03-17 PROCEDURE — 71045 X-RAY EXAM CHEST 1 VIEW: CPT

## 2021-03-17 PROCEDURE — 85025 COMPLETE CBC W/AUTO DIFF WBC: CPT | Performed by: EMERGENCY MEDICINE

## 2021-03-17 PROCEDURE — 93005 ELECTROCARDIOGRAM TRACING: CPT | Performed by: EMERGENCY MEDICINE

## 2021-03-17 PROCEDURE — 84484 ASSAY OF TROPONIN QUANT: CPT | Performed by: EMERGENCY MEDICINE

## 2021-03-17 PROCEDURE — 85007 BL SMEAR W/DIFF WBC COUNT: CPT | Performed by: EMERGENCY MEDICINE

## 2021-03-17 RX ORDER — LOSARTAN POTASSIUM 100 MG/1
25 TABLET ORAL DAILY
COMMUNITY
End: 2021-04-12 | Stop reason: DRUGHIGH

## 2021-03-17 RX ORDER — NITROGLYCERIN 0.4 MG/1
0.4 TABLET SUBLINGUAL
Qty: 30 TABLET | Refills: 0 | Status: SHIPPED | OUTPATIENT
Start: 2021-03-17

## 2021-03-17 RX ORDER — SODIUM CHLORIDE 0.9 % (FLUSH) 0.9 %
10 SYRINGE (ML) INJECTION AS NEEDED
Status: DISCONTINUED | OUTPATIENT
Start: 2021-03-17 | End: 2021-03-17 | Stop reason: HOSPADM

## 2021-03-17 RX ADMIN — NITROGLYCERIN 1 INCH: 20 OINTMENT TOPICAL at 11:18

## 2021-03-17 NOTE — DISCHARGE INSTRUCTIONS
Rest.  Continue your current meds.  Nitroglycerin as prescribed for chest pain.  Return to the ER if symptoms recur and don't resolve with Nitroglycerin.

## 2021-03-17 NOTE — ED PROVIDER NOTES
"Subjective   66-year-old female referred to the emergency department by her PCP ( \"O\" at [facility]) for further evaluation of chest pain and hypertension.  The patient states that she has been having intermittent right upper quadrant to right lower chest pain for the past 2 weeks.  The pain radiates into her right upper back.  Nothing seems to bring on the pain or resolve it.  The patient states that her pain got much worse yesterday after taking her atenolol.  The patient was seen at [facility] today and reportedly had an abnormal EKG and uncontrolled hypertension.  She was sent to our emergency department for further evaluation.  Patient denies any associated diaphoresis, nausea or vomiting.  No shortness of breath.  No cough.  No fever.  The patient has a history of hypertension, COPD (not on O2), hyperlipidemia and arthritis.  She has a family history of heart disease.  The patient smokes up to a pack of cigarettes daily.  No alcohol use.          Review of Systems   Constitutional: Negative for chills and fever.   HENT: Negative for sore throat.    Respiratory: Negative for cough and shortness of breath.    Cardiovascular: Positive for chest pain. Negative for palpitations and leg swelling.   Gastrointestinal: Positive for abdominal pain (Right upper quadrant). Negative for nausea and vomiting.   Genitourinary: Negative for dysuria.   Musculoskeletal: Positive for back pain.   Skin: Negative for rash.   Neurological: Negative for light-headedness and headaches.   Hematological: Negative.    Psychiatric/Behavioral: Negative.        Past Medical History:   Diagnosis Date   • Arthritis    • COPD (chronic obstructive pulmonary disease) (CMS/[facility]ly [facility])    • Hyperlipidemia    • Hypertension        Allergies   Allergen Reactions   • Amlodipine Other (See Comments)   • Antihistamines, Chlorpheniramine-Type Other (See Comments)   • Clonidine Derivatives Angioedema   • Hydralazine Unknown - Low " Severity   • Lisinopril Cough   • Metoprolol Other (See Comments)   • Prednisone Other (See Comments)     SHAKING AND BURNING       History reviewed. No pertinent surgical history.    Family History   Problem Relation Age of Onset   • Colon polyps Mother    • Colon cancer Neg Hx        Social History     Socioeconomic History   • Marital status:      Spouse name: Not on file   • Number of children: Not on file   • Years of education: Not on file   • Highest education level: Not on file   Tobacco Use   • Smoking status: Current Every Day Smoker     Packs/day: 1.00   • Smokeless tobacco: Never Used   Substance and Sexual Activity   • Alcohol use: Never   • Drug use: Never   • Sexual activity: Defer           Objective   Physical Exam  Constitutional:       General: She is not in acute distress.     Appearance: She is well-developed.   HENT:      Head: Normocephalic.      Nose: Nose normal.      Mouth/Throat:      Mouth: Mucous membranes are moist.   Eyes:      Conjunctiva/sclera: Conjunctivae normal.      Pupils: Pupils are equal, round, and reactive to light.   Cardiovascular:      Rate and Rhythm: Normal rate.      Pulses: Normal pulses.      Heart sounds: Normal heart sounds.   Pulmonary:      Effort: Pulmonary effort is normal.      Breath sounds: Normal breath sounds.   Chest:      Chest wall: No tenderness.   Abdominal:      General: Bowel sounds are normal.      Tenderness: There is no abdominal tenderness.   Musculoskeletal:         General: No tenderness. Normal range of motion.      Cervical back: Normal range of motion.   Skin:     General: Skin is warm and dry.   Neurological:      General: No focal deficit present.      Mental Status: She is alert and oriented to person, place, and time.   Psychiatric:         Mood and Affect: Mood normal.         Procedures           ED Course        The pt states her pain is currently at 3/10.  Her BP is near 200 systolic.  She is allergic to multiple  antihypertensives.  I will place NTG paste on her while awaiting her workup.      13:02 EDT  Pt is resting comfortably.  Her chest pain is resolved.  Her troponin is negative.  EKG shows nothing acute.  The pt's blood pressure is down to 146/56.  Her heart score is 5.  I will plan to d/c on NTG and have her f/u with the [facility] clinic and her PCP.    DISCHARGE    Patient discharged in stable condition.    Reviewed implications of results, diagnosis, meds, responsibility to follow up, warning signs and symptoms of possible worsening, potential complications and reasons to return to ER.    Patient/Family voiced understanding of above instructions.    Discussed plan for discharge, as there is no emergent indication for admission.  Pt/family is agreeable and understands need for follow up and possible repeat testing.  Pt/family is aware that discharge does not mean that nothing is wrong but that it indicates no emergency is currently present that requires admission and they must continue care with follow-up as given below or with a physician of their choice.     FOLLOW-UP  Umu Colindres MD  [address] [place] [id number] [phone]      call for follow up appointment    [facility] CARDIOLOGY  [address]  [address]  [facility] [address] [phone]             Medication List      New Prescriptions    nitroglycerin 0.4 MG SL tablet  Commonly known as: NITROSTAT  Place 1 tablet under the tongue Every 5 (Five) Minutes As Needed for Chest Pain. Take no more than 3 doses in 15 minutes.        Stop    citalopram 20 MG tablet  Commonly known as: CeleXA     cloNIDine 0.1 MG tablet  Commonly known as: CATAPRES     Zoloft 25 MG tablet  Generic drug: sertraline           Where to Get Your Medications      These medications were sent to [facility] [address] [place] [address] - [address] [place] [address] - [phone]  - [phone] [address],  [place] [address]    Phone: [phone]   · nitroglycerin 0.4 MG SL tablet                                            MDM    Final diagnoses:   Intermittent chest pain   Uncontrolled hypertension       ED Disposition  ED Disposition     ED Disposition Condition Comment    Discharge Stable           Umu Colindres MD  [address]  [place] [address]  [phone]      call for follow up appointment    [facility] CARDIOLOG[facility]  [address]  [address]  [facility] [address] [phone]             Medication List      New Prescriptions    nitroglycerin 0.4 MG SL tablet  Commonly known as: NITROSTAT  Place 1 tablet under the tongue Every 5 (Five) Minutes As Needed for Chest Pain. Take no more than 3 doses in 15 minutes.        Stop    citalopram 20 MG tablet  Commonly known as: CeleXA     cloNIDine 0.1 MG tablet  Commonly known as: CATAPRES     Zoloft 25 MG tablet  Generic drug: sertraline           Where to Get Your Medications      These medications were sent to [facility] [address] [place] [address] - [address] [place] [address] [phone]  - [phone] [place] [address]    Phone: [phone]   · nitroglycerin 0.4 MG SL tablet          Gonzalez Olivarez, PA  03/17/21 1302

## 2021-03-24 ENCOUNTER — OFFICE VISIT (OUTPATIENT)
Dept: CARDIOLOGY | Facility: HOSPITAL | Age: 66
End: 2021-03-24

## 2021-03-24 VITALS
HEART RATE: 56 BPM | DIASTOLIC BLOOD PRESSURE: 78 MMHG | RESPIRATION RATE: 18 BRPM | HEIGHT: 62 IN | SYSTOLIC BLOOD PRESSURE: 178 MMHG | OXYGEN SATURATION: 98 % | TEMPERATURE: 98.2 F | WEIGHT: 178.44 LBS | BODY MASS INDEX: 32.84 KG/M2

## 2021-03-24 DIAGNOSIS — R06.09 DOE (DYSPNEA ON EXERTION): ICD-10-CM

## 2021-03-24 DIAGNOSIS — Z82.49 FAMILY HISTORY OF PREMATURE CAD: ICD-10-CM

## 2021-03-24 DIAGNOSIS — R53.83 OTHER FATIGUE: ICD-10-CM

## 2021-03-24 DIAGNOSIS — R94.31 ABNORMAL EKG: ICD-10-CM

## 2021-03-24 DIAGNOSIS — I10 ESSENTIAL HYPERTENSION: ICD-10-CM

## 2021-03-24 DIAGNOSIS — J44.9 CHRONIC OBSTRUCTIVE PULMONARY DISEASE, UNSPECIFIED COPD TYPE (HCC): ICD-10-CM

## 2021-03-24 DIAGNOSIS — Z72.0 TOBACCO USE: ICD-10-CM

## 2021-03-24 DIAGNOSIS — R07.2 PRECORDIAL PAIN: Primary | ICD-10-CM

## 2021-03-24 PROCEDURE — 99214 OFFICE O/P EST MOD 30 MIN: CPT | Performed by: NURSE PRACTITIONER

## 2021-03-24 RX ORDER — CHLORTHALIDONE 25 MG/1
25 TABLET ORAL DAILY
Qty: 30 TABLET | Refills: 11 | Status: SHIPPED | OUTPATIENT
Start: 2021-03-24

## 2021-03-24 NOTE — PROGRESS NOTES
"Chief Complaint  Establish Care and Chest Pain    Subjective    History of Present Illness {CC  Problem List  Visit  Diagnosis   Encounters  Notes  Medications  Labs  Result Review Imaging  Media :23}       History of Present Illness   58-year-old female presents the office today for ongoing evaluation of her chest pain and hypertension.  She reports the blood pressure had been under good control up until a few weeks ago.  She reports she began experiencing pain in the right and center of her chest.  She describes the pain as sharp, stabbing and burning.  Pain is intermittent and sometimes worsens with exertion and sometimes present at rest.  Pain does radiate around to the back.  Patient was evaluated in the ED On 3/17/2021 was given Nitropaste.  She reports some improvement in chest pain after taking Nitropaste.  She was given p.o. nitro but notes she has not taken any since discharge from the hospital.  She notes significant fatigue and sometimes has trouble catching her breath.  Denies dizziness, presyncope, syncope, orthopnea, PND or pedal edema.  She has a long list of allergies/intolerances to multiple antihypertensive medications.  History of stress test many years ago. She has a family history of premature coronary artery disease.  She is a smoker and currently smokes 1 pack/day  Vital Signs:   Vitals:    03/24/21 1129 03/24/21 1130 03/24/21 1131   BP: (!) 187/79 176/78 178/78   BP Location: Right arm Left arm Left arm   Patient Position: Sitting Standing Sitting   Cuff Size: Large Adult Large Adult Large Adult   Pulse: 55 59 56   Resp:   18   Temp:   98.2 °F (36.8 °C)   TempSrc:   Temporal   SpO2: 99% 99% 98%   Weight:   80.9 kg (178 lb 7 oz)   Height:   157.5 cm (62\")     Body mass index is 32.64 kg/m².  Physical Exam  Vitals and nursing note reviewed.   Constitutional:       Appearance: Normal appearance.   HENT:      Head: Normocephalic.   Eyes:      Pupils: Pupils are equal, round, and " reactive to light.   Cardiovascular:      Rate and Rhythm: Normal rate and regular rhythm.      Pulses: Normal pulses.      Heart sounds: Normal heart sounds. No murmur heard.     Pulmonary:      Effort: Pulmonary effort is normal.      Breath sounds: Normal breath sounds.   Abdominal:      General: Bowel sounds are normal.      Palpations: Abdomen is soft.   Musculoskeletal:         General: Normal range of motion.      Cervical back: Normal range of motion.      Right lower leg: No edema.      Left lower leg: No edema.   Skin:     General: Skin is warm and dry.      Capillary Refill: Capillary refill takes less than 2 seconds.   Neurological:      Mental Status: She is alert and oriented to person, place, and time.   Psychiatric:         Mood and Affect: Mood normal.         Thought Content: Thought content normal.              Result Review  Data Reviewed:{ Labs  Result Review  Imaging  Med Tab  Media :23}   Troponin (03/17/2021 09:54)  CBC & Differential (03/17/2021 09:54)  Comprehensive Metabolic Panel (03/17/2021 09:54)  Lipase (03/17/2021 09:54)  BNP (03/17/2021 09:54)  Troponin (03/17/2021 12:35)  ECG 12 Lead (03/17/2021 11:50)  ECG 12 Lead (03/17/2021 09:54)                Assessment and Plan {CC Problem List  Visit Diagnosis  ROS  Review (Popup)  Health Maintenance  Quality  BestPractice  Medications  SmartSets  SnapShot Encounters  Media :23}   1. Precordial pain  jenny score 2  - Stress Test With Myocardial Perfusion (1 Day); Future    2. STEWART (dyspnea on exertion)    - Stress Test With Myocardial Perfusion (1 Day); Future    3. Tobacco use  It is very important that she quit smoking. There are various alternatives available to help with this difficult task, but first and foremost, she must make a firm commitment and decision to quit. The nature of nicotine addiction is discussed. The correct use, cost and side effects of nicotine replacement therapy such as gum or patches was discussed.  Bupropion and its cost (sometimes not covered fully by insurance) and side effects are reviewed.  I recommend she not allow potential costs of treatment to deter her from using nicotine replacement therapy or bupropion, as the long term economic and health benefits are obvious.  - Stress Test With Myocardial Perfusion (1 Day); Future education time 1.5  Minutes. Patient is not interested in smoking cessation     4. Abnormal EKG    - Stress Test With Myocardial Perfusion (1 Day); Future    5. Family history of premature CAD    - Stress Test With Myocardial Perfusion (1 Day); Future    6. Other fatigue    - Stress Test With Myocardial Perfusion (1 Day); Future    7. Essential hypertension  Begin  chlorthalidone 25 mg daily  Continue atenolol and losartan  HTN Education provided today including signs and symptoms, medication management, daily blood pressure monitoring. Patient encouraged to call the [facility] with any abnormal readings.   - Stress Test With Myocardial Perfusion (1 Day); Future    8. Chronic obstructive pulmonary disease, unspecified COPD type (CMS/HCC)    - Stress Test With Myocardial Perfusion (1 Day); Future          Follow Up {Instructions Charge Capture  Follow-up Communications :23}   Return in about 2 weeks (around 4/7/2021) for Office visit, HTN.    Patient was given instructions and counseling regarding her condition or for health maintenance advice. Please see specific information pulled into the AVS if appropriate.  Patient was instructed to call the [facility] with any questions, concerns, or worsening symptoms.    *Please note that portions of this note were completed with a voice recognition program. Efforts were made to edit the dictations, but occasionally words are mistranscribed.

## 2021-03-30 ENCOUNTER — IMMUNIZATION (OUTPATIENT)
Dept: VACCINE CLINIC | Facility: HOSPITAL | Age: 66
End: 2021-03-30

## 2021-03-30 PROCEDURE — 0002A: CPT | Performed by: INTERNAL MEDICINE

## 2021-03-30 PROCEDURE — 91300 HC SARSCOV02 VAC 30MCG/0.3ML IM: CPT | Performed by: INTERNAL MEDICINE

## 2021-04-09 ENCOUNTER — APPOINTMENT (OUTPATIENT)
Dept: PREADMISSION TESTING | Facility: HOSPITAL | Age: 66
End: 2021-04-09

## 2021-04-09 PROCEDURE — U0005 INFEC AGEN DETEC AMPLI PROBE: HCPCS

## 2021-04-09 PROCEDURE — U0004 COV-19 TEST NON-CDC HGH THRU: HCPCS

## 2021-04-09 PROCEDURE — C9803 HOPD COVID-19 SPEC COLLECT: HCPCS

## 2021-04-10 LAB — SARS-COV-2 RNA NOSE QL NAA+PROBE: NOT DETECTED

## 2021-04-12 ENCOUNTER — HOSPITAL ENCOUNTER (OUTPATIENT)
Dept: CARDIOLOGY | Facility: HOSPITAL | Age: 66
Discharge: HOME | End: 2021-04-12
Admitting: NURSE PRACTITIONER

## 2021-04-12 ENCOUNTER — OFFICE VISIT (OUTPATIENT)
Dept: CARDIOLOGY | Facility: HOSPITAL | Age: 66
End: 2021-04-12

## 2021-04-12 VITALS
HEART RATE: 49 BPM | DIASTOLIC BLOOD PRESSURE: 72 MMHG | WEIGHT: 178.35 LBS | BODY MASS INDEX: 32.82 KG/M2 | HEIGHT: 62 IN | SYSTOLIC BLOOD PRESSURE: 120 MMHG

## 2021-04-12 VITALS
SYSTOLIC BLOOD PRESSURE: 134 MMHG | DIASTOLIC BLOOD PRESSURE: 72 MMHG | RESPIRATION RATE: 18 BRPM | OXYGEN SATURATION: 99 % | HEART RATE: 57 BPM | TEMPERATURE: 97.3 F | HEIGHT: 62 IN | WEIGHT: 181.56 LBS | BODY MASS INDEX: 33.41 KG/M2

## 2021-04-12 DIAGNOSIS — J44.9 CHRONIC OBSTRUCTIVE PULMONARY DISEASE, UNSPECIFIED COPD TYPE (HCC): ICD-10-CM

## 2021-04-12 DIAGNOSIS — R07.2 PRECORDIAL PAIN: Primary | ICD-10-CM

## 2021-04-12 DIAGNOSIS — R94.31 ABNORMAL EKG: ICD-10-CM

## 2021-04-12 DIAGNOSIS — R06.09 DOE (DYSPNEA ON EXERTION): ICD-10-CM

## 2021-04-12 DIAGNOSIS — I10 ESSENTIAL HYPERTENSION: ICD-10-CM

## 2021-04-12 DIAGNOSIS — Z82.49 FAMILY HISTORY OF PREMATURE CAD: ICD-10-CM

## 2021-04-12 DIAGNOSIS — Z72.0 TOBACCO USE: ICD-10-CM

## 2021-04-12 DIAGNOSIS — R53.83 OTHER FATIGUE: ICD-10-CM

## 2021-04-12 DIAGNOSIS — R07.2 PRECORDIAL PAIN: ICD-10-CM

## 2021-04-12 LAB
BH CV NUCLEAR PRIOR STUDY: 3
BH CV REST NUCLEAR ISOTOPE DOSE: 9.8 MCI
BH CV STRESS BP STAGE 2: NORMAL
BH CV STRESS BP STAGE 4: NORMAL
BH CV STRESS COMMENTS STAGE 1: NORMAL
BH CV STRESS DOSE REGADENOSON STAGE 1: 0.4
BH CV STRESS DURATION MIN STAGE 1: 1
BH CV STRESS DURATION MIN STAGE 2: 1
BH CV STRESS DURATION MIN STAGE 3: 1
BH CV STRESS DURATION MIN STAGE 4: 1
BH CV STRESS DURATION SEC STAGE 1: 0
BH CV STRESS DURATION SEC STAGE 2: 0
BH CV STRESS DURATION SEC STAGE 3: 0
BH CV STRESS DURATION SEC STAGE 4: 0
BH CV STRESS HR STAGE 1: 70
BH CV STRESS HR STAGE 2: 99
BH CV STRESS HR STAGE 3: 96
BH CV STRESS HR STAGE 4: 94
BH CV STRESS NUCLEAR ISOTOPE DOSE: 32 MCI
BH CV STRESS O2 STAGE 1: 99
BH CV STRESS O2 STAGE 2: 99
BH CV STRESS O2 STAGE 3: 98
BH CV STRESS O2 STAGE 4: 100
BH CV STRESS PROTOCOL 1: NORMAL
BH CV STRESS RECOVERY BP: NORMAL MMHG
BH CV STRESS RECOVERY HR: 81 BPM
BH CV STRESS RECOVERY O2: 98 %
BH CV STRESS STAGE 1: 1
BH CV STRESS STAGE 2: 2
BH CV STRESS STAGE 3: 3
BH CV STRESS STAGE 4: 4
LV EF NUC BP: 77 %
MAXIMAL PREDICTED HEART RATE: 152 BPM
PERCENT MAX PREDICTED HR: 65.13 %
STRESS BASELINE BP: NORMAL MMHG
STRESS BASELINE HR: 47 BPM
STRESS O2 SAT REST: 99 %
STRESS PERCENT HR: 77 %
STRESS POST ESTIMATED WORKLOAD: 1 METS
STRESS POST EXERCISE DUR MIN: 4 MIN
STRESS POST EXERCISE DUR SEC: 6 SEC
STRESS POST O2 SAT PEAK: 100 %
STRESS POST PEAK BP: NORMAL MMHG
STRESS POST PEAK HR: 99 BPM
STRESS TARGET HR: 129 BPM

## 2021-04-12 PROCEDURE — 0 TECHNETIUM SESTAMIBI: Performed by: NURSE PRACTITIONER

## 2021-04-12 PROCEDURE — 78452 HT MUSCLE IMAGE SPECT MULT: CPT

## 2021-04-12 PROCEDURE — 93017 CV STRESS TEST TRACING ONLY: CPT

## 2021-04-12 PROCEDURE — A9500 TC99M SESTAMIBI: HCPCS | Performed by: NURSE PRACTITIONER

## 2021-04-12 PROCEDURE — 93018 CV STRESS TEST I&R ONLY: CPT | Performed by: INTERNAL MEDICINE

## 2021-04-12 PROCEDURE — 99214 OFFICE O/P EST MOD 30 MIN: CPT | Performed by: NURSE PRACTITIONER

## 2021-04-12 PROCEDURE — 78452 HT MUSCLE IMAGE SPECT MULT: CPT | Performed by: INTERNAL MEDICINE

## 2021-04-12 PROCEDURE — 25010000002 REGADENOSON 0.4 MG/5ML SOLUTION: Performed by: NURSE PRACTITIONER

## 2021-04-12 RX ORDER — LOSARTAN POTASSIUM 25 MG/1
25 TABLET ORAL DAILY
Qty: 30 TABLET | Refills: 3 | Status: SHIPPED | OUTPATIENT
Start: 2021-04-12

## 2021-04-12 RX ORDER — CAFFEINE CITRATE 20 MG/ML
60 SOLUTION INTRAVENOUS ONCE
Status: COMPLETED | OUTPATIENT
Start: 2021-04-12 | End: 2021-04-12

## 2021-04-12 RX ADMIN — CAFFEINE CITRATE 60 MG: 20 INJECTION, SOLUTION INTRAVENOUS at 09:32

## 2021-04-12 RX ADMIN — TECHNETIUM TC 99M SESTAMIBI 1 DOSE: 1 INJECTION INTRAVENOUS at 07:45

## 2021-04-12 RX ADMIN — TECHNETIUM TC 99M SESTAMIBI 1 DOSE: 1 INJECTION INTRAVENOUS at 09:26

## 2021-04-12 RX ADMIN — REGADENOSON 0.4 MG: 0.08 INJECTION, SOLUTION INTRAVENOUS at 09:25

## 2021-04-12 NOTE — PROGRESS NOTES
"Chief Complaint  Follow-up and Hypertension    Subjective    History of Present Illness {CC  Problem List  Visit  Diagnosis   Encounters  Notes  Medications  Labs  Result Review Imaging  Media :23}       History of Present Illness   66-year-old female presents to the office today for ongoing evaluation of hypertension and chest pain.  Had a stress test immediately prior to this appointment.  Reports the chest pressure has resolved.  She chlorthalidone her blood pressure has been dropping in the 80s and 90s.  She subsequently started losartan back to 25 mg daily.  She reports since doing that her blood pressure has been running in the 120s.  Notes overall she is feeling significantly better.  Currently denies chest pain, dyspnea, palpitations, presyncope, edema in BLEs.  Objective     Vital Signs:   Vitals:    04/12/21 1033 04/12/21 1050   BP: 163/72 134/72   BP Location: Left arm Left arm   Patient Position: Sitting Sitting   Cuff Size: Adult    Pulse: 57    Resp: 18    Temp: 97.3 °F (36.3 °C)    TempSrc: Temporal    SpO2: 99%    Weight: 82.4 kg (181 lb 9 oz)    Height: 157.5 cm (62\")      Body mass index is 33.21 kg/m².  Physical Exam  Vitals and nursing note reviewed.   Constitutional:       Appearance: Normal appearance.   HENT:      Head: Normocephalic.   Eyes:      Pupils: Pupils are equal, round, and reactive to light.   Cardiovascular:      Rate and Rhythm: Normal rate and regular rhythm.      Pulses: Normal pulses.      Heart sounds: Normal heart sounds. No murmur heard.     Pulmonary:      Effort: Pulmonary effort is normal.      Breath sounds: Normal breath sounds.   Abdominal:      General: Bowel sounds are normal.      Palpations: Abdomen is soft.   Musculoskeletal:         General: Normal range of motion.      Cervical back: Normal range of motion.      Right lower leg: No edema.      Left lower leg: No edema.   Skin:     General: Skin is warm and dry.      Capillary Refill: Capillary refill " takes less than 2 seconds.   Neurological:      Mental Status: She is alert and oriented to person, place, and time.   Psychiatric:         Mood and Affect: Mood normal.         Thought Content: Thought content normal.              Result Review  Data Reviewed:{ Labs  Result Review  Imaging  Med Tab  Media :23}   Troponin (03/17/2021 09:54)  CBC & Differential (03/17/2021 09:54)  Comprehensive Metabolic Panel (03/17/2021 09:54)  Lipase (03/17/2021 09:54)  BNP (03/17/2021 09:54)  Troponin (03/17/2021 12:35)  Stress test pending from today              Assessment and Plan {CC Problem List  Visit Diagnosis  ROS  Review (Popup)  Health Maintenance  Quality  BestPractice  Medications  SmartSets  SnapShot Encounters  Media :23}   1. Precordial pain  Stress test pending from earlier today    2. STEWART   Stress test pending from earlier today   3. Essential hypertension  Well-controlled onCozaar, chlorthalidone, atenolol    4. Tobacco use  It is very important that she quit smoking. There are various alternatives available to help with this difficult task, but first and foremost, she must make a firm commitment and decision to quit. The nature of nicotine addiction is discussed. The correct use, cost and side effects of nicotine replacement therapy such as gum or patches was discussed. Bupropion and its cost (sometimes not covered fully by insurance) and side effects are reviewed.  I recommend she not allow potential costs of treatment to deter her from using nicotine replacement therapy or bupropion, as the long term economic and health benefits are obvious. Education time 1 minute        Follow Up {Instructions Charge Capture  Follow-up Communications :23}   Return if symptoms worsen or fail to improve.    Patient was given instructions and counseling regarding her condition or for health maintenance advice. Please see specific information pulled into the AVS if appropriate.  Patient was instructed to call  the [facility] with any questions, concerns, or worsening symptoms.    *Please note that portions of this note were completed with a voice recognition program. Efforts were made to edit the dictations, but occasionally words are mistranscribed.